# Patient Record
Sex: FEMALE | Race: OTHER | HISPANIC OR LATINO
[De-identification: names, ages, dates, MRNs, and addresses within clinical notes are randomized per-mention and may not be internally consistent; named-entity substitution may affect disease eponyms.]

---

## 2017-01-04 ENCOUNTER — OTHER (OUTPATIENT)
Age: 57
End: 2017-01-04

## 2017-01-04 DIAGNOSIS — M25.569 PAIN IN UNSPECIFIED KNEE: ICD-10-CM

## 2017-01-12 ENCOUNTER — APPOINTMENT (OUTPATIENT)
Dept: ORTHOPEDIC SURGERY | Facility: CLINIC | Age: 57
End: 2017-01-12

## 2017-01-12 VITALS
WEIGHT: 184 LBS | BODY MASS INDEX: 36.12 KG/M2 | SYSTOLIC BLOOD PRESSURE: 133 MMHG | HEART RATE: 102 BPM | DIASTOLIC BLOOD PRESSURE: 82 MMHG | HEIGHT: 60 IN

## 2017-01-12 DIAGNOSIS — Z87.09 PERSONAL HISTORY OF OTHER DISEASES OF THE RESPIRATORY SYSTEM: ICD-10-CM

## 2017-01-12 DIAGNOSIS — F17.210 NICOTINE DEPENDENCE, CIGARETTES, UNCOMPLICATED: ICD-10-CM

## 2017-01-12 DIAGNOSIS — Z80.9 FAMILY HISTORY OF MALIGNANT NEOPLASM, UNSPECIFIED: ICD-10-CM

## 2017-01-12 DIAGNOSIS — Z86.79 PERSONAL HISTORY OF OTHER DISEASES OF THE CIRCULATORY SYSTEM: ICD-10-CM

## 2017-01-12 DIAGNOSIS — Z82.61 FAMILY HISTORY OF ARTHRITIS: ICD-10-CM

## 2017-01-12 DIAGNOSIS — Z78.9 OTHER SPECIFIED HEALTH STATUS: ICD-10-CM

## 2017-01-12 RX ORDER — CLONAZEPAM 1 MG/1
1 TABLET ORAL
Refills: 0 | Status: ACTIVE | COMMUNITY

## 2017-01-12 RX ORDER — METOPROLOL SUCCINATE 100 MG/1
100 TABLET, EXTENDED RELEASE ORAL
Refills: 0 | Status: ACTIVE | COMMUNITY

## 2017-01-12 RX ORDER — ASENAPINE MALEATE 5 MG/1
5 TABLET SUBLINGUAL
Refills: 0 | Status: ACTIVE | COMMUNITY

## 2017-01-12 RX ORDER — VENLAFAXINE 37.5 MG/1
TABLET ORAL
Refills: 0 | Status: ACTIVE | COMMUNITY

## 2017-12-04 ENCOUNTER — OTHER (OUTPATIENT)
Age: 57
End: 2017-12-04

## 2019-02-15 ENCOUNTER — TRANSCRIPTION ENCOUNTER (OUTPATIENT)
Age: 59
End: 2019-02-15

## 2019-02-15 ENCOUNTER — APPOINTMENT (OUTPATIENT)
Dept: CARDIOLOGY | Facility: CLINIC | Age: 59
End: 2019-02-15

## 2019-02-15 DIAGNOSIS — E03.9 HYPOTHYROIDISM, UNSPECIFIED: ICD-10-CM

## 2019-02-15 RX ORDER — METFORMIN HYDROCHLORIDE 1000 MG/1
1000 TABLET, COATED ORAL
Refills: 0 | Status: ACTIVE | COMMUNITY

## 2019-02-15 RX ORDER — LEVOTHYROXINE SODIUM 0.1 MG/1
100 TABLET ORAL
Refills: 0 | Status: ACTIVE | COMMUNITY

## 2019-04-19 ENCOUNTER — APPOINTMENT (OUTPATIENT)
Dept: NEUROLOGY | Facility: CLINIC | Age: 59
End: 2019-04-19
Payer: MEDICARE

## 2019-04-19 VITALS
SYSTOLIC BLOOD PRESSURE: 128 MMHG | WEIGHT: 165 LBS | DIASTOLIC BLOOD PRESSURE: 80 MMHG | HEIGHT: 59 IN | BODY MASS INDEX: 33.26 KG/M2

## 2019-04-19 DIAGNOSIS — Z86.39 PERSONAL HISTORY OF OTHER ENDOCRINE, NUTRITIONAL AND METABOLIC DISEASE: ICD-10-CM

## 2019-04-19 DIAGNOSIS — Z82.49 FAMILY HISTORY OF ISCHEMIC HEART DISEASE AND OTHER DISEASES OF THE CIRCULATORY SYSTEM: ICD-10-CM

## 2019-04-19 DIAGNOSIS — I10 ESSENTIAL (PRIMARY) HYPERTENSION: ICD-10-CM

## 2019-04-19 DIAGNOSIS — I63.9 CEREBRAL INFARCTION, UNSPECIFIED: ICD-10-CM

## 2019-04-19 DIAGNOSIS — E78.5 HYPERLIPIDEMIA, UNSPECIFIED: ICD-10-CM

## 2019-04-19 DIAGNOSIS — F17.200 NICOTINE DEPENDENCE, UNSPECIFIED, UNCOMPLICATED: ICD-10-CM

## 2019-04-19 DIAGNOSIS — E11.9 TYPE 2 DIABETES MELLITUS W/OUT COMPLICATIONS: ICD-10-CM

## 2019-04-19 PROCEDURE — 99204 OFFICE O/P NEW MOD 45 MIN: CPT

## 2019-04-19 NOTE — PHYSICAL EXAM
[General Appearance - Alert] : alert [General Appearance - In No Acute Distress] : in no acute distress [General Appearance - Well Nourished] : well nourished [General Appearance - Well Developed] : well developed [Person] : oriented to person [Time] : oriented to time [Place] : oriented to place [Short Term Intact] : short term memory intact [Remote Intact] : remote memory intact [Registration Intact] : recent registration memory intact [Concentration Intact] : normal concentrating ability [Span Intact] : the attention span was normal [Visual Intact] : visual attention was ~T not ~L decreased [Naming Objects] : no difficulty naming common objects [Repeating Phrases] : no difficulty repeating a phrase [Comprehension] : comprehension intact [Fluency] : fluency intact [Current Events] : adequate knowledge of current events [Past History] : adequate knowledge of personal past history [Cranial Nerves Optic (II)] : visual acuity intact bilaterally,  visual fields full to confrontation, pupils equal round and reactive to light [Cranial Nerves Trigeminal (V)] : facial sensation intact symmetrically [Cranial Nerves Oculomotor (III)] : extraocular motion intact [Cranial Nerves Vestibulocochlear (VIII)] : hearing was intact bilaterally [Cranial Nerves Accessory (XI - Cranial And Spinal)] : head turning and shoulder shrug symmetric [Cranial Nerves Glossopharyngeal (IX)] : tongue and palate midline [Cranial Nerves Hypoglossal (XII)] : there was no tongue deviation with protrusion [Cranial Nerves Facial Central - Right Only] : central 7th nerve weakness [Cranial Nerves Right Facial: House Grade ___(1-6)] : grade III (moderate, 60%) facial nerve function [Motor Strength] : muscle strength was normal in all four extremities [No Muscle Atrophy] : normal bulk in all four extremities [Paresis Pronator Drift Right-Sided] : no pronator drift on the right [Paresis Pronator Drift Left-Sided] : no pronator drift on the left [Sensation Tactile Decrease] : light touch was intact [Sensation Pain / Temperature Decrease] : pain and temperature was intact [Sensation Vibration Decrease] : vibration was intact [Proprioception] : proprioception was intact [Past-pointing] : there was no past-pointing [Balance] : balance was intact [Tremor] : no tremor present [2+] : Patella right 2+ [FreeTextEntry8] : tardive dyskinesia in mouth [Sclera] : the sclera and conjunctiva were normal [PERRL With Normal Accommodation] : pupils were equal in size, round, reactive to light, with normal accommodation [Optic Disc Abnormality] : the optic disc were normal in size and color [Extraocular Movements] : extraocular movements were intact [No APD] : no afferent pupillary defect [No ZHENG] : no internuclear ophthalmoplegia [Normal] : normal [Full Visual Field] : full visual field [Papilledema Of Both Eyes] : no papilledema [Edema] : there was no peripheral edema [Abnormal Walk] : normal gait [Motor Tone] : muscle strength and tone were normal

## 2019-04-19 NOTE — CONSULT LETTER
[Dear  ___] : Dear  [unfilled], [Consult Letter:] : I had the pleasure of evaluating your patient, [unfilled]. [Please see my note below.] : Please see my note below. [Sincerely,] : Sincerely, [Consult Closing:] : Thank you very much for allowing me to participate in the care of this patient.  If you have any questions, please do not hesitate to contact me. [FreeTextEntry3] : Arslan Hodges M.D., Ph.D. DPN-N\par [facility]\par Neurology at [place]\par Medical Director of Stroke Services\par [facility]\par

## 2019-04-19 NOTE — REASON FOR VISIT
[Consultation] : a consultation visit [Other: _____] : [unfilled] [FreeTextEntry1] : tardive dyskinesia, dysarthria

## 2019-04-19 NOTE — REVIEW OF SYSTEMS
[As Noted in HPI] : as noted in HPI [Difficulties in Speech] : speech difficulties [Negative] : Heme/Lymph

## 2019-04-19 NOTE — HISTORY OF PRESENT ILLNESS
[FreeTextEntry1] : Initial office visit April 19, 2019:\par This is a 59-year-old woman who presents today for neurologic evaluation. She is to primary issues. The first of which is tardive dyskinesia. She takes Saphris. She has some mild mouth movements. She said the dyskinesias and she's been on this medication. She also has a dysarthria. She states about a year ago she had sudden onset of dysarthria with facial droop. She thought it was a stroke. She did not seek medical attention at the time. She has multiple stroke risk factors. She is here today for neurologic evaluation and treatment.

## 2019-04-19 NOTE — ASSESSMENT
[FreeTextEntry1] : This is a 59-year-old woman comes in today for 2 problems.\par \par Tardive dyskinesia: I would recommend to discontinue Saphris and alternate medication. This will be left up to psychiatry.\par \par Probable stroke: She has multiple stroke risk factors including hypertension diabetes high cholesterol and smoking. I suspect that she likely had a stroke a year ago and there was a lacunar infarct as blending in with her small vessel changes seen on her recent MRI. I'd recommend tight blood pressure control as well as tight glycemic control. I will send a hemoglobin A1c. I've also recommended to stop smoking. I will check a fasting lipid profile to see if she needs statin.\par \par I will call her with the results and start a statin if necessary. I will see her back in the office in 2 months, sooner should the need arise.

## 2019-04-19 NOTE — DATA REVIEWED
[de-identified] : She brought a report of an MRI of her brain that was done in January of 2019. Showed significant small vessel ischemic changes. There was no acute stroke mass or bleed.

## 2019-04-23 ENCOUNTER — APPOINTMENT (OUTPATIENT)
Dept: NEUROLOGY | Facility: CLINIC | Age: 59
End: 2019-04-23

## 2019-06-25 ENCOUNTER — APPOINTMENT (OUTPATIENT)
Dept: NEUROLOGY | Facility: CLINIC | Age: 59
End: 2019-06-25

## 2020-12-14 ENCOUNTER — EMERGENCY (EMERGENCY)
Facility: HOSPITAL | Age: 60
LOS: 1 days | Discharge: HOME | End: 2020-12-14
Attending: EMERGENCY MEDICINE
Payer: MEDICARE

## 2020-12-14 VITALS
DIASTOLIC BLOOD PRESSURE: 79 MMHG | WEIGHT: 160.06 LBS | TEMPERATURE: 98 F | HEART RATE: 76 BPM | RESPIRATION RATE: 18 BRPM | SYSTOLIC BLOOD PRESSURE: 134 MMHG | HEIGHT: 59 IN | OXYGEN SATURATION: 99 %

## 2020-12-14 LAB
FLU A RESULT: SIGNIFICANT CHANGE UP
FLU A RESULT: SIGNIFICANT CHANGE UP
FLUAV AG NPH QL: SIGNIFICANT CHANGE UP
FLUBV AG NPH QL: SIGNIFICANT CHANGE UP
RSV RESULT: SIGNIFICANT CHANGE UP
RSV RNA RESP QL NAA+PROBE: SIGNIFICANT CHANGE UP
SARS-COV-2 RNA SPEC QL NAA+PROBE: SIGNIFICANT CHANGE UP

## 2020-12-14 PROCEDURE — 73130 X-RAY EXAM OF HAND: CPT | Mod: 26,LT

## 2020-12-14 PROCEDURE — 99284 EMERGENCY DEPT VISIT MOD MDM: CPT | Mod: 25

## 2020-12-14 PROCEDURE — 73562 X-RAY EXAM OF KNEE 3: CPT

## 2020-12-14 PROCEDURE — 73130 X-RAY EXAM OF HAND: CPT

## 2020-12-14 PROCEDURE — 73110 X-RAY EXAM OF WRIST: CPT

## 2020-12-14 PROCEDURE — 29125 APPL SHORT ARM SPLINT STATIC: CPT | Mod: LT

## 2020-12-14 PROCEDURE — 73590 X-RAY EXAM OF LOWER LEG: CPT

## 2020-12-14 PROCEDURE — 73590 X-RAY EXAM OF LOWER LEG: CPT | Mod: 26,RT

## 2020-12-14 PROCEDURE — 71101 X-RAY EXAM UNILAT RIBS/CHEST: CPT | Mod: 26

## 2020-12-14 PROCEDURE — 73110 X-RAY EXAM OF WRIST: CPT | Mod: 26,LT

## 2020-12-14 PROCEDURE — 71101 X-RAY EXAM UNILAT RIBS/CHEST: CPT

## 2020-12-14 PROCEDURE — U0003: CPT

## 2020-12-14 PROCEDURE — 73562 X-RAY EXAM OF KNEE 3: CPT | Mod: 26,RT

## 2020-12-14 PROCEDURE — 87631 RESP VIRUS 3-5 TARGETS: CPT

## 2020-12-14 RX ORDER — SODIUM CHLORIDE 9 MG/ML
1000 INJECTION INTRAMUSCULAR; INTRAVENOUS; SUBCUTANEOUS ONCE
Refills: 0 | Status: DISCONTINUED | OUTPATIENT
Start: 2020-12-14 | End: 2020-12-14

## 2020-12-14 RX ORDER — IOHEXOL 300 MG/ML
30 INJECTION, SOLUTION INTRAVENOUS ONCE
Refills: 0 | Status: DISCONTINUED | OUTPATIENT
Start: 2020-12-14 | End: 2020-12-14

## 2020-12-14 RX ORDER — ONDANSETRON 8 MG/1
4 TABLET, FILM COATED ORAL ONCE
Refills: 0 | Status: DISCONTINUED | OUTPATIENT
Start: 2020-12-14 | End: 2020-12-14

## 2020-12-14 RX ORDER — MORPHINE SULFATE 50 MG/1
4 CAPSULE, EXTENDED RELEASE ORAL ONCE
Refills: 0 | Status: DISCONTINUED | OUTPATIENT
Start: 2020-12-14 | End: 2020-12-14

## 2020-12-14 NOTE — ED STATDOCS - SKIN, MLM
skin normal color for race, warm, dry and intact. ecchymosis over dorsum of left hand, no excchymosis of left lateral chest wall, abrasion to anterior surface of right knee.

## 2020-12-14 NOTE — ED STATDOCS - OBJECTIVE STATEMENT
59 y/o F with PMHx of Anxiety, Depressed, High cholesterol, Hypertension, Hypothyroid, CVA presents to the ED s/p fall c/o left     Denies 59 y/o F with PMHx of Anxiety, Depressed, High cholesterol, Hypertension, Hypothyroid, CVA presents to the ED s/p fall yesterday c/o left wrist pain. Pt also reports pain to the left knee but still can bear weight on it and pain to the left ribs.   Denies n/v 59 y/o F with PMHx of Anxiety, Depressed, High cholesterol, Hypertension, Hypothyroid, CVA presents to the ED s/p fall yesterday injuring left wrist, left side of ribs, left hand, and right knee.  Denies head trauma or LOC.  Denies back or neck pain.  H/O prior stroke:  has persistent slurred speech.  Reports that she is able to bear weight on right leg and denies sob/ pleuritic chest pain.  Denies abd pain, flank pain, hematuria.

## 2020-12-14 NOTE — ED STATDOCS - PROGRESS NOTE DETAILS
Efren MOORE: X-rays reviewed and discussed with radiologist Dr. Liu.  Sugar tong splint placed.  Pt ambulating in the ED without difficulty.  Stable for discharge with ortho f/u.

## 2020-12-14 NOTE — ED ADULT TRIAGE NOTE - CHIEF COMPLAINT QUOTE
patient fell down 1/2 a flight of stairs, son brought in grandma, c/o left wrist pain with swelling to area and left leg pain, denies LOC, blood thinners. patient has slurred speech from previous stroke.

## 2020-12-14 NOTE — ED STATDOCS - NS_ ATTENDINGSCRIBEDETAILS _ED_A_ED_FT
I, Spencer Bales, performed the initial face to face bedside interview with this patient regarding history of present illness, review of symptoms and relevant past medical, social and family history.  I completed an independent physical examination.  I was the provider who initially evaluated this patient.  The history, relevant review of systems, past medical and surgical history, medical decision making, and physical examination was documented by the scribe in my presence and I attest to the accuracy of the documentation. Follow-up on ordered tests (ie labs, radiologic studies) and re-evaluation of the patient's status has been communicated to the resident.  Disposition of the patient will be based on test outcome and response to ED interventions.

## 2020-12-14 NOTE — ED STATDOCS - MUSCULOSKELETAL, MLM
swelling and ecchymosis to the dorsum of the left hand, 3rd and 4th metacarpal tenderness, Left wrist tenderness, tenderness to the left rib #9 in the anterior axillary line, anterior aspect of right knee abrasion no effusion, tenderness to the anterior tibia. LEFT WRIST/ HAND: swelling and ecchymosis to the dorsum of the left hand, 3rd and 4th metacarpal tenderness, Left wrist tenderness, CHEST WALL/ RIBS: tenderness to the left rib #9 in the anterior axillary line without crepitus,  RIGHT KNEE: abrasion over anterior surface,  no effusion, tenderness to the anterior tibia.

## 2020-12-14 NOTE — ED STATDOCS - CLINICAL SUMMARY MEDICAL DECISION MAKING FREE TEXT BOX
Will do X-ray  to evaluate for fractures. Fall with possible hand/ wrist and rib fracture:  X-ray  to evaluate for fractures.

## 2020-12-14 NOTE — ED STATDOCS - PATIENT PORTAL LINK FT
You can access the FollowMyHealth Patient Portal offered by [facility] by registering at the following website: [email or web address]. By joining [employer]’s FollowMyHealth portal, you will also be able to view your health information using other applications (apps) compatible with our system.

## 2020-12-14 NOTE — ED STATDOCS - PHYSICAL EXAMINATION
dorsom of the left hand   swelling and ecchymosis to the dorsum of the left hand, 3rd and 4th metacarpal tenderness, Left wrist tenderness, tenderness to the left rib #9 in the anterior axillary line, anterior aspect of right knee abrasion no effusion   tend to the anterior tibia swelling and ecchymosis to the dorsum of the left hand, 3rd and 4th metacarpal tenderness, Left wrist tenderness, tenderness to the left rib #9 in the anterior axillary line, anterior aspect of right knee abrasion no effusion, tenderness to the anterior tibia.

## 2020-12-14 NOTE — ED STATDOCS - CARE PROVIDER_API CALL
Ilya Gorman)  Orthopaedic Surgery  [address] [place] [address]  Phone: [phone]  Fax: [phone]  Follow Up Time:

## 2020-12-14 NOTE — ED STATDOCS - NSFOLLOWUPINSTRUCTIONS_ED_ALL_ED_FT
ICE AND ELEVATE LEFT ARM.  TAKE TYLENOL AS NEEDED FOR PAIN.  FOLLOW UP WITH ORTHOPEDIC SURGEON.  RETURN TO THE EMERGENCY ROOM FOR ANY NEW OR WORSENING ISSUES.    ------------------------------    Wrist Fracture in Adults    WHAT YOU NEED TO KNOW:    A wrist fracture is a break in one or more of the bones in your wrist.     Adult Arm Bones         DISCHARGE INSTRUCTIONS:    Return to the emergency department if:   •Your pain gets worse or does not get better after you take pain medicine.      •Your cast or splint breaks, gets wet, or is damaged.      •Your hand or fingers feel numb or cold.      •Your hand or fingers turn white or blue.      •Your splint or cast feels too tight.      •You have more pain or swelling after the cast or splint is put on.      Call your doctor if:   •You have a fever.      •There is a foul smell or blood coming from under the cast.      •You have questions or concerns about your condition or care.      Medicines: You may need any of the following:   •Prescription pain medicine may be given. Ask your healthcare provider how to take this medicine safely. Some prescription pain medicines contain acetaminophen. Do not take other medicines that contain acetaminophen without talking to your healthcare provider. Too much acetaminophen may cause liver damage. Prescription pain medicine may cause constipation. Ask your healthcare provider how to prevent or treat constipation.       •NSAIDs, such as ibuprofen, help decrease swelling, pain, and fever. NSAIDs can cause stomach bleeding or kidney problems in certain people. If you take blood thinner medicine, always ask your healthcare provider if NSAIDs are safe for you. Always read the medicine label and follow directions.      •Acetaminophen decreases pain and fever. It is available without a doctor's order. Ask how much to take and how often to take it. Follow directions. Read the labels of all other medicines you are using to see if they also contain acetaminophen, or ask your doctor or pharmacist. Acetaminophen can cause liver damage if not taken correctly. Do not use more than 4 grams (4,000 milligrams) total of acetaminophen in one day.       •Take your medicine as directed. Contact your healthcare provider if you think your medicine is not helping or if you have side effects. Tell him or her if you are allergic to any medicine. Keep a list of the medicines, vitamins, and herbs you take. Include the amounts, and when and why you take them. Bring the list or the pill bottles to follow-up visits. Carry your medicine list with you in case of an emergency.      Self-care:   •Rest as much as possible. Do not play contact sports until the healthcare provider says it is okay.      •Apply ice on your wrist for 15 to 20 minutes every hour or as directed. Use an ice pack, or put crushed ice in a plastic bag. Cover it with a towel before you place it on your skin. Ice helps prevent tissue damage and decreases swelling and pain.      •Elevate your wrist above the level of your heart as often as possible. This will help decrease swelling and pain. Prop your wrist on pillows or blankets to keep it elevated comfortably.             Cast or splint care:   •You may take a bath or shower as directed. Do not let your cast or splint get wet. Before bathing, cover the cast or splint with 2 plastic trash bags. Tape the bags to your skin above the cast or splint to seal out the water. Keep your arm out of the water in case the bag breaks. If a plaster cast gets wet and soft, call your healthcare provider.      •Check the skin around the cast or splint every day. You may put lotion on any red or sore areas.      •Do not push down or lean on the cast or brace because it may break.      •Do not scratch the skin under the cast by putting a sharp or pointed object inside the cast.      Go to physical therapy as directed: You may need physical therapy after your wrist heals and the cast is removed. A physical therapist can teach you exercises to help improve movement and strength and to decrease pain.    Follow up with your doctor or bone specialist as directed: You may need to return to have your cast removed. You may also need an x-ray to check how well the bone has healed. Write down your questions so you remember to ask them during your visits.

## 2021-04-08 ENCOUNTER — TRANSCRIPTION ENCOUNTER (OUTPATIENT)
Age: 61
End: 2021-04-08

## 2021-10-12 ENCOUNTER — EMERGENCY (EMERGENCY)
Facility: HOSPITAL | Age: 61
LOS: 1 days | Discharge: HOME | End: 2021-10-12
Attending: EMERGENCY MEDICINE
Payer: MEDICARE

## 2021-10-12 VITALS
OXYGEN SATURATION: 98 % | SYSTOLIC BLOOD PRESSURE: 165 MMHG | TEMPERATURE: 99 F | HEIGHT: 59 IN | RESPIRATION RATE: 18 BRPM | WEIGHT: 199.96 LBS | DIASTOLIC BLOOD PRESSURE: 88 MMHG | HEART RATE: 75 BPM

## 2021-10-12 PROCEDURE — 73564 X-RAY EXAM KNEE 4 OR MORE: CPT | Mod: 26,RT

## 2021-10-12 PROCEDURE — 73564 X-RAY EXAM KNEE 4 OR MORE: CPT

## 2021-10-12 PROCEDURE — 99284 EMERGENCY DEPT VISIT MOD MDM: CPT

## 2021-10-12 PROCEDURE — 99283 EMERGENCY DEPT VISIT LOW MDM: CPT | Mod: 25

## 2021-10-12 NOTE — ED PROVIDER NOTE - NS ED ROS FT
Gen: denies fever, chills, fatigue, weight loss  Skin: denies rashes, laceration, bruising  HEENT: denies visual changes, ear pain, nasal congestion, throat pain  Respiratory: denies CACERES, SOB, cough, wheezing  Cardiovascular: denies chest pain, palpitations, diaphoresis, LE edema  GI: denies abdominal pain, n/v/d  : denies dysuria, frequency, urgency, bowel/bladder incontinence  MSK: +R knee pain/swelling. Denies back pain, neck pain  Neuro: denies headache, dizziness, weakness, numbness  Psych: denies anxiety, depression, SI/HI, visual/auditory hallucinations

## 2021-10-12 NOTE — ED PROVIDER NOTE - PATIENT PORTAL LINK FT
You can access the FollowMyHealth Patient Portal offered by [facility] by registering at the following website: [email or web address]. By joining [employer]’s FollowMyHealth portal, you will also be able to view your health information using other applications (apps) compatible with our system.

## 2021-10-12 NOTE — ED ADULT TRIAGE NOTE - BSA (M2)
Normal vision: sees adequately in most situations; can see medication labels, newsprint
1.84
negative

## 2021-10-12 NOTE — ED PROVIDER NOTE - OBJECTIVE STATEMENT
62yo F pmhx HTN, HLD, hypothyroid, DMII, CVA with residual slurred speech, anxiety, depression presents to ED c/o right knee pain, swelling s/p mechanical fall. States she was walking and accidentally stepped into hole in ground causing her to trip and fall onto right knee. No head trauma or LOC. Noting pain to anterior right knee, unable to bend at knee 2/2 pain, unable to bear weight on right. Denies numbness, tingling, weakness, hip pain, back pain, head trauma, LOC, syncope, cp, sob.

## 2021-10-12 NOTE — ED ADULT NURSE NOTE - NSIMPLEMENTINTERV_GEN_ALL_ED
Implemented All Fall with Harm Risk Interventions:  Shields to call system. Call bell, personal items and telephone within reach. Instruct patient to call for assistance. Room bathroom lighting operational. Non-slip footwear when patient is off stretcher. Physically safe environment: no spills, clutter or unnecessary equipment. Stretcher in lowest position, wheels locked, appropriate side rails in place. Provide visual cue, wrist band, yellow gown, etc. Monitor gait and stability. Monitor for mental status changes and reorient to person, place, and time. Review medications for side effects contributing to fall risk. Reinforce activity limits and safety measures with patient and family. Provide visual clues: red socks.

## 2021-10-12 NOTE — PROVIDER CONTACT NOTE (OTHER) - ASSESSMENT
Pt independent with use of RW for all mobility. Pt WBAT with knee immobilizer on. Pt declined stairs.

## 2021-10-12 NOTE — PHYSICAL THERAPY INITIAL EVALUATION ADULT - RANGE OF MOTION EXAMINATION, REHAB EVAL
Right Knee ROM not assessed due to immobilizer/bilateral upper extremity ROM was WNL (within normal limits)/Left LE ROM was WNL (within normal limits)

## 2021-10-12 NOTE — CHART NOTE - NSCHARTNOTEFT_GEN_A_CORE
RW delivered to bedside, patient aware she will be responsible for the cost if her insurance doesn't cover the cost. Patient verbalized understanding.  CCC called patients son who will be picking the patient up at the hospital.

## 2021-10-12 NOTE — ED PROVIDER NOTE - PROGRESS NOTE DETAILS
Pt ambulated with Physical therapy with walker. as per PT pt can go home with rolling walker. case management contacted, provided pt rolling walker. pt provided f/u information for ortho.

## 2021-10-12 NOTE — ED ADULT NURSE NOTE - OBJECTIVE STATEMENT
Patient has slurred speech from prior CVA, pt fell today landing on right knee having swelling and painful ambulation, pt placed in immobilizer by PA, to have PT consult

## 2021-10-12 NOTE — ED PROVIDER NOTE - CARE PROVIDER_API CALL
Ilya Gorman)  Orthopaedic Surgery  [address] [place] [address]  Phone: [phone]  Fax: [phone]  Follow Up Time:

## 2021-10-12 NOTE — ED PROVIDER NOTE - PHYSICAL EXAMINATION
Gen: No acute distress, non toxic  HEENT: Mucous membranes moist, pink conjunctivae, EOMI  CV: RRR, nl s1/s2.  Resp: CTAB, normal rate and effort  Neuro: A&O x 3, slurred speech (chronic from previous CVA), sensorimotor intact  MSK: +Effusion to right knee, pain with knee flexion. +TTP over anterior knee. compartment soft/compressible  Vasc: DP pulses 2+ b/l  Skin: No rashes. intact and perfused.

## 2021-10-12 NOTE — ED PROVIDER NOTE - ATTENDING CONTRIBUTION TO CARE
I, Charo Muhammad, performed a face to face bedside interview with this patient regarding history of present illness, review of symptoms and relevant past medical, social and family history.  I completed an independent physical examination. Medical decision making, follow-up on ordered tests (ie labs, radiologic studies) and re-evaluation of the patient's status has been communicated to the ACP.  Disposition of the patient will be based on test outcome and response to ED interventions.     patient with trip and fall onto rt knee with pain and effusion. limited ability to lift/bend rt knee due to pain. xray with vertical fracture, knee immobilizer, PT eval. outpt ortho Follow up

## 2021-10-12 NOTE — PHYSICAL THERAPY INITIAL EVALUATION ADULT - GENERAL OBSERVATIONS, REHAB EVAL
Pt received in wheelchair coming back from the bathroom, + IV Loc, + Right Knee immobilizer, breathing on RA in NAD, in 10/10 right knee pain, agreeable to PT evaluation

## 2021-10-12 NOTE — PHYSICAL THERAPY INITIAL EVALUATION ADULT - PERTINENT HX OF CURRENT PROBLEM, REHAB EVAL
presents to ED c/o right knee pain, swelling s/p mechanical fall. States she was walking and accidentally stepped into hole in ground causing her to trip and fall onto right knee

## 2021-10-12 NOTE — ED PROVIDER NOTE - NSICDXPASTMEDICALHX_GEN_ALL_CORE_FT
PAST MEDICAL HISTORY:  Anxiety     Depressed     High cholesterol     Hypertension     Hypothyroid

## 2021-10-12 NOTE — PROVIDER CONTACT NOTE (OTHER) - SITUATION
chart reviewed, contents noted, orders received. PT eval completed & documented see note for details.

## 2021-10-12 NOTE — PHYSICAL THERAPY INITIAL EVALUATION ADULT - CAPILLARY REFILL, RLE
Spoke with Judie and let her know that we do recommend that she go ahead and get her HD flu shot if she has not already. I also let her know that it looks like she is UTD on her Pneumonia vaccines for now. She verbalized understanding.    less than/equal to 3 secs

## 2021-10-12 NOTE — ED PROVIDER NOTE - NSFOLLOWUPINSTRUCTIONS_ED_ALL_ED_FT
- Follow up with your doctor within 2-3 days.   - Return to the ED for any new or worsening symptoms.   - Remain in knee immobilizer  - Elevate right leg while resting  - Ambulate with walker at all times- only put as much weight down as you can tolerate  - Follow-up with orthopedics within 1 week for further evaluation    Fracture    A fracture is a break in one of your bones. This can occur from a variety of injuries, especially traumatic ones. Symptoms include pain, bruising, or swelling. Do not use the injured limb. If a fracture is in one of the bones below your waist, do not put weight on that limb unless instructed to do so by your healthcare provider. Crutches or a cane may have been provided. A splint or cast may have been applied by your health care provider. Make sure to keep it dry and follow up with an orthopedist as instructed.    SEEK IMMEDIATE MEDICAL CARE IF YOU HAVE ANY OF THE FOLLOWING SYMPTOMS: numbness, tingling, increasing pain, or weakness in any part of the injured limb.

## 2021-10-12 NOTE — PHYSICAL THERAPY INITIAL EVALUATION ADULT - ADDITIONAL COMMENTS
Pt reports living in private home with her mom & brother who are able to assist. Pt reports there are no steps to enter, 7 stairs inside but all needs can be met on the first floor. Pt independent prior to admission. Owns no DME.

## 2021-10-29 ENCOUNTER — EMERGENCY (EMERGENCY)
Facility: HOSPITAL | Age: 61
LOS: 1 days | Discharge: HOME | End: 2021-10-29
Attending: EMERGENCY MEDICINE
Payer: MEDICARE

## 2021-10-29 VITALS
HEIGHT: 59 IN | HEART RATE: 80 BPM | OXYGEN SATURATION: 97 % | RESPIRATION RATE: 16 BRPM | TEMPERATURE: 98 F | SYSTOLIC BLOOD PRESSURE: 151 MMHG | DIASTOLIC BLOOD PRESSURE: 82 MMHG

## 2021-10-29 PROCEDURE — 99281 EMR DPT VST MAYX REQ PHY/QHP: CPT

## 2021-10-29 PROCEDURE — 99283 EMERGENCY DEPT VISIT LOW MDM: CPT

## 2021-10-29 RX ORDER — CLONAZEPAM 1 MG
1 TABLET ORAL
Qty: 15 | Refills: 0
Start: 2021-10-29 | End: 2021-11-02

## 2021-10-29 NOTE — ED PROVIDER NOTE - ATTENDING CONTRIBUTION TO CARE
Pt requiring bridging rx for clonopin.  To prevent withdrawal, rx given and pt has fu appt with clinician

## 2021-10-29 NOTE — ED PROVIDER NOTE - CLINICAL SUMMARY MEDICAL DECISION MAKING FREE TEXT BOX
62yo F with past medical history of  HTN, HLD, hypothyroid, DMII, CVA with residual slurred speech, anxiety, depression presents to ED for medication. Pt states that she ran out of her clonazepam. Pt states that she has an appointment with her Psychiatrist in 5 days. Pt not in any distress and has no SI/HI. Pt D/C in stable condition and will F/U with her psychiatrist.

## 2021-10-29 NOTE — ED PROVIDER NOTE - OBJECTIVE STATEMENT
62yo F with past medical history of  HTN, HLD, hypothyroid, DMII, CVA with residual slurred speech, anxiety, depression presents to ED for medication. Pt states that she ran out of her clonazepam. Pt states that she has an appointment with her Psychiatrist in 5 days. Pt says that she does not have any new onset of symptoms. Pt denies any SI/HI at this time.

## 2021-10-29 NOTE — ED ADULT TRIAGE NOTE - CHIEF COMPLAINT QUOTE
Pt states ran out of her Klonopin and mentions she has apt set up right now at [facility] for a refill.

## 2021-10-29 NOTE — ED PROVIDER NOTE - PATIENT PORTAL LINK FT
You can access the FollowMyHealth Patient Portal offered by [facility] by registering at the following website: [email or web address]. By joining [employer]’s FollowMyHealth portal, you will also be able to view your health information using other applications (apps) compatible with our system.

## 2023-05-26 NOTE — ED ADULT NURSE NOTE - CADM POA CENTRAL LINE
How Severe Are They?: moderate Is This A New Presentation, Or A Follow-Up?: Follow Up Actinic Keratoses No

## 2023-11-02 ENCOUNTER — APPOINTMENT (OUTPATIENT)
Dept: PULMONOLOGY | Facility: CLINIC | Age: 63
End: 2023-11-02
Payer: MEDICARE

## 2023-11-02 VITALS
WEIGHT: 150 LBS | DIASTOLIC BLOOD PRESSURE: 74 MMHG | BODY MASS INDEX: 30.24 KG/M2 | HEART RATE: 82 BPM | SYSTOLIC BLOOD PRESSURE: 126 MMHG | HEIGHT: 59 IN | RESPIRATION RATE: 16 BRPM | OXYGEN SATURATION: 97 %

## 2023-11-02 DIAGNOSIS — Z01.812 ENCOUNTER FOR PREPROCEDURAL LABORATORY EXAMINATION: ICD-10-CM

## 2023-11-02 PROCEDURE — 99204 OFFICE O/P NEW MOD 45 MIN: CPT

## 2023-11-02 RX ORDER — UMECLIDINIUM 62.5 UG/1
62.5 AEROSOL, POWDER ORAL
Qty: 1 | Refills: 5 | Status: ACTIVE | COMMUNITY
Start: 2023-11-02

## 2023-12-02 ENCOUNTER — EMERGENCY (EMERGENCY)
Facility: HOSPITAL | Age: 63
LOS: 1 days | Discharge: HOME | End: 2023-12-02
Attending: EMERGENCY MEDICINE
Payer: MEDICARE

## 2023-12-02 VITALS
HEART RATE: 75 BPM | WEIGHT: 150.8 LBS | SYSTOLIC BLOOD PRESSURE: 122 MMHG | OXYGEN SATURATION: 97 % | RESPIRATION RATE: 20 BRPM | TEMPERATURE: 98 F | HEIGHT: 64 IN | DIASTOLIC BLOOD PRESSURE: 63 MMHG

## 2023-12-02 LAB
ALBUMIN SERPL ELPH-MCNC: 3.7 G/DL — SIGNIFICANT CHANGE UP (ref 3.3–5.2)
ALBUMIN SERPL ELPH-MCNC: 3.7 G/DL — SIGNIFICANT CHANGE UP (ref 3.3–5.2)
ALP SERPL-CCNC: 70 U/L — SIGNIFICANT CHANGE UP (ref 40–120)
ALP SERPL-CCNC: 70 U/L — SIGNIFICANT CHANGE UP (ref 40–120)
ALT FLD-CCNC: 14 U/L — SIGNIFICANT CHANGE UP
ALT FLD-CCNC: 14 U/L — SIGNIFICANT CHANGE UP
ANION GAP SERPL CALC-SCNC: 13 MMOL/L — SIGNIFICANT CHANGE UP (ref 5–17)
ANION GAP SERPL CALC-SCNC: 13 MMOL/L — SIGNIFICANT CHANGE UP (ref 5–17)
AST SERPL-CCNC: 22 U/L — SIGNIFICANT CHANGE UP
AST SERPL-CCNC: 22 U/L — SIGNIFICANT CHANGE UP
BASOPHILS # BLD AUTO: 0.05 K/UL — SIGNIFICANT CHANGE UP (ref 0–0.2)
BASOPHILS # BLD AUTO: 0.05 K/UL — SIGNIFICANT CHANGE UP (ref 0–0.2)
BASOPHILS NFR BLD AUTO: 0.4 % — SIGNIFICANT CHANGE UP (ref 0–2)
BASOPHILS NFR BLD AUTO: 0.4 % — SIGNIFICANT CHANGE UP (ref 0–2)
BILIRUB SERPL-MCNC: <0.2 MG/DL — LOW (ref 0.4–2)
BILIRUB SERPL-MCNC: <0.2 MG/DL — LOW (ref 0.4–2)
BUN SERPL-MCNC: 16.8 MG/DL — SIGNIFICANT CHANGE UP (ref 8–20)
BUN SERPL-MCNC: 16.8 MG/DL — SIGNIFICANT CHANGE UP (ref 8–20)
CALCIUM SERPL-MCNC: 8.5 MG/DL — SIGNIFICANT CHANGE UP (ref 8.4–10.5)
CALCIUM SERPL-MCNC: 8.5 MG/DL — SIGNIFICANT CHANGE UP (ref 8.4–10.5)
CHLORIDE SERPL-SCNC: 101 MMOL/L — SIGNIFICANT CHANGE UP (ref 96–108)
CHLORIDE SERPL-SCNC: 101 MMOL/L — SIGNIFICANT CHANGE UP (ref 96–108)
CO2 SERPL-SCNC: 23 MMOL/L — SIGNIFICANT CHANGE UP (ref 22–29)
CO2 SERPL-SCNC: 23 MMOL/L — SIGNIFICANT CHANGE UP (ref 22–29)
CREAT SERPL-MCNC: 0.5 MG/DL — SIGNIFICANT CHANGE UP (ref 0.5–1.3)
CREAT SERPL-MCNC: 0.5 MG/DL — SIGNIFICANT CHANGE UP (ref 0.5–1.3)
EGFR: 105 ML/MIN/1.73M2 — SIGNIFICANT CHANGE UP
EGFR: 105 ML/MIN/1.73M2 — SIGNIFICANT CHANGE UP
EOSINOPHIL # BLD AUTO: 0.43 K/UL — SIGNIFICANT CHANGE UP (ref 0–0.5)
EOSINOPHIL # BLD AUTO: 0.43 K/UL — SIGNIFICANT CHANGE UP (ref 0–0.5)
EOSINOPHIL NFR BLD AUTO: 3.3 % — SIGNIFICANT CHANGE UP (ref 0–6)
EOSINOPHIL NFR BLD AUTO: 3.3 % — SIGNIFICANT CHANGE UP (ref 0–6)
FLUAV AG NPH QL: SIGNIFICANT CHANGE UP
FLUAV AG NPH QL: SIGNIFICANT CHANGE UP
FLUBV AG NPH QL: SIGNIFICANT CHANGE UP
FLUBV AG NPH QL: SIGNIFICANT CHANGE UP
GLUCOSE SERPL-MCNC: 100 MG/DL — HIGH (ref 70–99)
GLUCOSE SERPL-MCNC: 100 MG/DL — HIGH (ref 70–99)
HCT VFR BLD CALC: 40 % — SIGNIFICANT CHANGE UP (ref 34.5–45)
HCT VFR BLD CALC: 40 % — SIGNIFICANT CHANGE UP (ref 34.5–45)
HGB BLD-MCNC: 13.1 G/DL — SIGNIFICANT CHANGE UP (ref 11.5–15.5)
HGB BLD-MCNC: 13.1 G/DL — SIGNIFICANT CHANGE UP (ref 11.5–15.5)
IMM GRANULOCYTES NFR BLD AUTO: 0.2 % — SIGNIFICANT CHANGE UP (ref 0–0.9)
IMM GRANULOCYTES NFR BLD AUTO: 0.2 % — SIGNIFICANT CHANGE UP (ref 0–0.9)
LIDOCAIN IGE QN: 42 U/L — SIGNIFICANT CHANGE UP (ref 22–51)
LIDOCAIN IGE QN: 42 U/L — SIGNIFICANT CHANGE UP (ref 22–51)
LYMPHOCYTES # BLD AUTO: 23.3 % — SIGNIFICANT CHANGE UP (ref 13–44)
LYMPHOCYTES # BLD AUTO: 23.3 % — SIGNIFICANT CHANGE UP (ref 13–44)
LYMPHOCYTES # BLD AUTO: 3 K/UL — SIGNIFICANT CHANGE UP (ref 1–3.3)
LYMPHOCYTES # BLD AUTO: 3 K/UL — SIGNIFICANT CHANGE UP (ref 1–3.3)
MCHC RBC-ENTMCNC: 28.4 PG — SIGNIFICANT CHANGE UP (ref 27–34)
MCHC RBC-ENTMCNC: 28.4 PG — SIGNIFICANT CHANGE UP (ref 27–34)
MCHC RBC-ENTMCNC: 32.8 GM/DL — SIGNIFICANT CHANGE UP (ref 32–36)
MCHC RBC-ENTMCNC: 32.8 GM/DL — SIGNIFICANT CHANGE UP (ref 32–36)
MCV RBC AUTO: 86.6 FL — SIGNIFICANT CHANGE UP (ref 80–100)
MCV RBC AUTO: 86.6 FL — SIGNIFICANT CHANGE UP (ref 80–100)
MONOCYTES # BLD AUTO: 0.8 K/UL — SIGNIFICANT CHANGE UP (ref 0–0.9)
MONOCYTES # BLD AUTO: 0.8 K/UL — SIGNIFICANT CHANGE UP (ref 0–0.9)
MONOCYTES NFR BLD AUTO: 6.2 % — SIGNIFICANT CHANGE UP (ref 2–14)
MONOCYTES NFR BLD AUTO: 6.2 % — SIGNIFICANT CHANGE UP (ref 2–14)
NEUTROPHILS # BLD AUTO: 8.59 K/UL — HIGH (ref 1.8–7.4)
NEUTROPHILS # BLD AUTO: 8.59 K/UL — HIGH (ref 1.8–7.4)
NEUTROPHILS NFR BLD AUTO: 66.6 % — SIGNIFICANT CHANGE UP (ref 43–77)
NEUTROPHILS NFR BLD AUTO: 66.6 % — SIGNIFICANT CHANGE UP (ref 43–77)
PLATELET # BLD AUTO: 221 K/UL — SIGNIFICANT CHANGE UP (ref 150–400)
PLATELET # BLD AUTO: 221 K/UL — SIGNIFICANT CHANGE UP (ref 150–400)
POTASSIUM SERPL-MCNC: 4.1 MMOL/L — SIGNIFICANT CHANGE UP (ref 3.5–5.3)
POTASSIUM SERPL-MCNC: 4.1 MMOL/L — SIGNIFICANT CHANGE UP (ref 3.5–5.3)
POTASSIUM SERPL-SCNC: 4.1 MMOL/L — SIGNIFICANT CHANGE UP (ref 3.5–5.3)
POTASSIUM SERPL-SCNC: 4.1 MMOL/L — SIGNIFICANT CHANGE UP (ref 3.5–5.3)
PROT SERPL-MCNC: 6.3 G/DL — LOW (ref 6.6–8.7)
PROT SERPL-MCNC: 6.3 G/DL — LOW (ref 6.6–8.7)
RBC # BLD: 4.62 M/UL — SIGNIFICANT CHANGE UP (ref 3.8–5.2)
RBC # BLD: 4.62 M/UL — SIGNIFICANT CHANGE UP (ref 3.8–5.2)
RBC # FLD: 13.8 % — SIGNIFICANT CHANGE UP (ref 10.3–14.5)
RBC # FLD: 13.8 % — SIGNIFICANT CHANGE UP (ref 10.3–14.5)
RSV RNA NPH QL NAA+NON-PROBE: SIGNIFICANT CHANGE UP
RSV RNA NPH QL NAA+NON-PROBE: SIGNIFICANT CHANGE UP
SARS-COV-2 RNA SPEC QL NAA+PROBE: SIGNIFICANT CHANGE UP
SARS-COV-2 RNA SPEC QL NAA+PROBE: SIGNIFICANT CHANGE UP
SODIUM SERPL-SCNC: 137 MMOL/L — SIGNIFICANT CHANGE UP (ref 135–145)
SODIUM SERPL-SCNC: 137 MMOL/L — SIGNIFICANT CHANGE UP (ref 135–145)
WBC # BLD: 12.89 K/UL — HIGH (ref 3.8–10.5)
WBC # BLD: 12.89 K/UL — HIGH (ref 3.8–10.5)
WBC # FLD AUTO: 12.89 K/UL — HIGH (ref 3.8–10.5)
WBC # FLD AUTO: 12.89 K/UL — HIGH (ref 3.8–10.5)

## 2023-12-02 PROCEDURE — 93010 ELECTROCARDIOGRAM REPORT: CPT

## 2023-12-02 PROCEDURE — 80053 COMPREHEN METABOLIC PANEL: CPT

## 2023-12-02 PROCEDURE — 99284 EMERGENCY DEPT VISIT MOD MDM: CPT | Mod: 25

## 2023-12-02 PROCEDURE — 96374 THER/PROPH/DIAG INJ IV PUSH: CPT

## 2023-12-02 PROCEDURE — 36415 COLL VENOUS BLD VENIPUNCTURE: CPT

## 2023-12-02 PROCEDURE — 93005 ELECTROCARDIOGRAM TRACING: CPT

## 2023-12-02 PROCEDURE — 85025 COMPLETE CBC W/AUTO DIFF WBC: CPT

## 2023-12-02 PROCEDURE — 99284 EMERGENCY DEPT VISIT MOD MDM: CPT

## 2023-12-02 PROCEDURE — 87637 SARSCOV2&INF A&B&RSV AMP PRB: CPT

## 2023-12-02 PROCEDURE — 83690 ASSAY OF LIPASE: CPT

## 2023-12-02 RX ORDER — ONDANSETRON 8 MG/1
1 TABLET, FILM COATED ORAL
Qty: 1 | Refills: 0
Start: 2023-12-02

## 2023-12-02 RX ORDER — ONDANSETRON 8 MG/1
4 TABLET, FILM COATED ORAL ONCE
Refills: 0 | Status: COMPLETED | OUTPATIENT
Start: 2023-12-02 | End: 2023-12-02

## 2023-12-02 RX ORDER — SODIUM CHLORIDE 9 MG/ML
1000 INJECTION INTRAMUSCULAR; INTRAVENOUS; SUBCUTANEOUS ONCE
Refills: 0 | Status: COMPLETED | OUTPATIENT
Start: 2023-12-02 | End: 2023-12-02

## 2023-12-02 RX ADMIN — SODIUM CHLORIDE 1000 MILLILITER(S): 9 INJECTION INTRAMUSCULAR; INTRAVENOUS; SUBCUTANEOUS at 09:06

## 2023-12-02 RX ADMIN — ONDANSETRON 4 MILLIGRAM(S): 8 TABLET, FILM COATED ORAL at 09:06

## 2023-12-02 NOTE — ED STATDOCS - PHYSICAL EXAMINATION
Gen: NAD, AOx3  Head: NCAT  HEENT: oral mucosa moist, normal conjunctiva, oropharynx clear without exudate or erythema  Lung: CTAB, no respiratory distress, no wheezing, rales, rhonchi  CV: normal s1/s2, rrr, no murmurs, Normal perfusion, pulses 2+ throughout  Abd: soft, NTND, guarding  MSK: No edema, no visible deformities, full range of motion in all 4 extremities  Neuro: CN II-XII grossly intact, No focal neurologic deficits  Skin: No rash   Psych: normal affect

## 2023-12-02 NOTE — ED STATDOCS - WET READ LAUNCH FT
C/o sickle cell pain onset Friday.pain to bilateral arms,hips, and legs.Took pain meds 2 hours ago at home without relief.also c/o pain to back of head from fall 3 weeks ago. States he was here last week for same thing. Denies URI.thinks this pain was triggered by the weather.   There are no Wet Read(s) to document.

## 2023-12-02 NOTE — ED STATDOCS - CLINICAL SUMMARY MEDICAL DECISION MAKING FREE TEXT BOX
64 y/o female presents with nausea, vomiting, diarrhea, and malaise since yesterday. Plan to check labs, give IV fluids, and reassess. Exam nonfocal, doubt acute intraabdominal pathology. Advised follow up with pulmonologist and pcp if feeling better and results are normal 62 y/o female presents with nausea, vomiting, diarrhea, and malaise since yesterday. Plan to check labs, give IV fluids, and reassess. Exam nonfocal, doubt acute intraabdominal pathology. Advised follow up with pulmonologist and pcp if feeling better and results are normal 62 y/o female presents with nausea, vomiting, diarrhea, and malaise since yesterday. Plan to check labs, give IV fluids, and reassess. Exam nonfocal, doubt acute intraabdominal pathology/infectious/obstructive process. Likely viral gastroenteritis. Advised follow up with pulmonologist and pcp if feeling better and results are normal

## 2023-12-02 NOTE — ED STATDOCS - OBJECTIVE STATEMENT
62 y/o female with PMHx of HTN, DM, mass on right lung presents with vomiting, not tolerating PO intake, diarrhea, abdominal pain, and fatigue since yesterday. Also complains of loss of appetite and recent unintentional weight loss of 25lbs. Patient has recent seen 2 pulmonologists for the mass, and has not yet had lung biopsy. Denies fever or nausea. Reports allergy to tylenol, contrast dye    Interpretation provided by patient's son 64 y/o female with PMHx of HTN, DM, mass on right lung presents with vomiting, not tolerating PO intake, diarrhea, abdominal pain, and fatigue since yesterday. Also complains of loss of appetite and recent unintentional weight loss of 25lbs. Patient has recent seen 2 pulmonologists for the mass, and has not yet had lung biopsy. Denies fever or nausea. Reports allergy to tylenol, contrast dye    Interpretation provided by patient's son

## 2023-12-02 NOTE — ED STATDOCS - PATIENT PORTAL LINK FT
You can access the FollowMyHealth Patient Portal offered by [facility] by registering at the following website: [email or web address]. By joining [employer]’s FollowMyHealth portal, you will also be able to view your health information using other applications (apps) compatible with our system. You can access the FollowMyHealth Patient Portal offered by [facility] by registering at the following website: [email or web address]. By joining [employer]’s FollowMyHealth portal, you will also be able to view your health information using other applications (apps) compatible with our system. You can access the FollowMyHealth Patient Portal offered by [facility] by registering at the following website: [email or web address]. By joining [employer]’s FollowMyHealth portal, you will also be able to view your health information using other applications (apps) compatible with our system.

## 2023-12-02 NOTE — ED STATDOCS - PROGRESS NOTE DETAILS
Pt feels well, tolerating PO. Updated pt regarding reassuring findings. Instructed pt to follow up with her outpatient providers for further evaluation of her lung mass. Return precautions discussed. Will dc with rx for Mary Davila MD Pt feels well, tolerating PO. Updated pt regarding reassuring findings. Instructed pt to follow up with her outpatient providers for further evaluation of her lung mass. Return precautions discussed. Will dc with rx for Mary Davlia MD

## 2024-02-06 ENCOUNTER — APPOINTMENT (OUTPATIENT)
Dept: PULMONOLOGY | Facility: CLINIC | Age: 64
End: 2024-02-06
Payer: MEDICARE

## 2024-02-06 VITALS
SYSTOLIC BLOOD PRESSURE: 124 MMHG | RESPIRATION RATE: 16 BRPM | HEART RATE: 70 BPM | DIASTOLIC BLOOD PRESSURE: 72 MMHG | OXYGEN SATURATION: 96 %

## 2024-02-06 VITALS — BODY MASS INDEX: 23.1 KG/M2 | WEIGHT: 132 LBS | HEIGHT: 63.5 IN

## 2024-02-06 DIAGNOSIS — R91.8 OTHER NONSPECIFIC ABNORMAL FINDING OF LUNG FIELD: ICD-10-CM

## 2024-02-06 PROCEDURE — 85018 HEMOGLOBIN: CPT | Mod: QW

## 2024-02-06 PROCEDURE — 99214 OFFICE O/P EST MOD 30 MIN: CPT | Mod: 25

## 2024-02-06 PROCEDURE — 94727 GAS DIL/WSHOT DETER LNG VOL: CPT

## 2024-02-06 PROCEDURE — 94010 BREATHING CAPACITY TEST: CPT

## 2024-02-06 PROCEDURE — 94729 DIFFUSING CAPACITY: CPT

## 2024-02-06 RX ORDER — IRON/IRON ASP GLY/FA/MV-MIN 38 125-25-1MG
TABLET ORAL
Refills: 0 | Status: ACTIVE | COMMUNITY

## 2024-02-06 NOTE — ASSESSMENT
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, former smoker (30 pack years, D/C 2021), HTN, HLD, DM, hypothyroidism, anxiety/depression who presents for f/u visit  - PFT today showed FEV1/FVC 75%, FEV1 82%, FVC 83%, MMEF 75/25 57%, DLCOc 79% - This shows no significant obstruction or restriction with normal DLCO - She needs repeat CT chest ASAP - She needs IR biopsy of R lung pleural-based mass - Referral was ordered for IR biopsy - Phone number given to son - Will need PST - At this time she has no significant pulmonary contraindications to proceed with IR biopsy; she is a mild pulmonary risk - F/u in approx 3 mo time  The patient expressed understanding and agreement with the plan as outlined above, and accepts that it is their responsibility to be compliant with any recommended testing, treatment, and follow-up visits. All relevant questions and concerns were addressed.  30 minutes of time were spent on the encounter. Medical records were reviewed, including but not limited to hospital records, outpatient records, laboratory data, and diagnostic imaging studies. Greater than 50% of the face-to-face encounter time was spent on counseling and/or coordination of care.  Ilya Snow M.D. Pulmonary & Critical Care Medicine [facility] [place] [address] [place] [address] T: [phone] F: [phone]

## 2024-02-06 NOTE — RESULTS/DATA
[TextEntry] : CHANELLE PATIENT NAME: Kam Clark Zoraya PATIENT PHONE NUMBER: [phone] PATIENT ID: [id number] : 1960 DATE OF EXAM: 10/13/2023 R. Phys. Name: Jael Joe R. Phys. Address: [address] [place] [address] R. Phys. Phone: [phone] CT-CHEST NON CONTRAST  History: Abnormal lung findings.  R91.1 Pulmonary Nodule   CT scan of the chest was performed with multislice axial images with reconstructions performed in axial, sagittal and coronal planes. This study was performed using automatic exposure control and an iterative reconstruction technique (radiation dose reduction software) to obtain a diagnostic image quality scan with patient dose as low as reasonably achievable. mA and kV were adjusted according to patient's size. The administered radiation dose was 3.34 mSv.  Comparison is made with recent PET/CT from 2023.  THORACIC INLET: No suspicious mass.  LUNGS AND PLEURA: There is persistent subpleural lobular density in posterior right lower lobe without significant change or improvement. This spans about 4 x 2.5 x 5 cm.  There are multiple new nodular densities in posterior right upper lobe, in cluster or tree and bud pattern. Individual nodular density can measure up to 13 mm.  There are subpleural curvilinear scarlike densities in anterior right upper lobe and along the minor fissure. Correlate with prior pulmonary history.  There is pleural thickening along right lung base posteriorly. There is bronchial wall thickening reflecting bronchial inflammatory changes. There is no suspicious lung infiltrate in left lung.  MEDIASTINUM, YADY AND AXILLAE: No suspicious mass or adenopathy.  HEART AND VESSELS: Heart size is within normal limits. Coronary calcifications are noted. There is no aneurysm of thoracic aorta.  CHEST WALL/SOFT TISSUES: No suspicious mass.  LIMITED UPPER ABDOMEN: No suspicious new finding.  BONES: No suspicious new finding.  IMPRESSION:  New cluster of nodular densities in posterior right upper lobe, perhaps inflammatory process. Correlate clinically to rule out lingering symptom of pneumonia.  No significant change or improvement of subpleural lobular masslike consolidation in posterior right lower lobe, nature unclear.  Continue close CT monitoring (in 1-3 months) or tissue sampling to rule out neoplasm as clinically indicated.  Signed by: Anay Mcadams MD Signed Date: 10/13/2023 7:42 PM EDT    SIGNED BY: Anay Mcadams M.D., Ext. [phone] 10/13/2023 07:42 PM  ~~~~~~~~~~~~~~~~~~~~~~~~~~~~~~~~~~~~~~~~~~~~~~~~~~~~~~~~~~~~~~~~~~~~~~~~  [facility]ROTONLENORA PATIENT NAME: Kam Clark Zoraya PATIENT PHONE NUMBER: [phone] PATIENT ID: [id number] : 1960 DATE OF EXAM: 2023 R. Phys. Name: Jael Joe R. Phys. Address: [address] [place] [address] R. Phys. Phone: [phone] NM PET/CT SKULL BASE TO MID THIGH  HISTORY: Evaluate pulmonary nodule INDICATION: The patient presents for initial treatment strategy.  TECHNIQUE: 58 minutes following the following intravenous administration of 12.1 mCi of F-18 labeled Fluorodeoxyglucose (FDG) in a Right Antecubital vein, whole body scan was performed with acquisition from the base of skull to mid femurs. Prior to intravenous FDG administration, fingerstick glucose level was measured, with a value of 130 mg/dl. The SUV is normalized to body weight and is reported as SUV maximum.  CONTRAST: Oral contrast was administered as per protocol. IV contrast was not administered.  COMPARISON: No prior PET/CT is available for comparison  SCINTIGRAPHIC FINDINGS: HEAD AND NECK: Normal FDG uptake in the visualized portions of brain as well as the soft tissues of the head and neck. CHEST: There is mild FDG activity corresponding with a consolidative opacity in the posterior right lung base (SUV 2.5, 5.4 x 2.5 cm, series 3 image 103). Findings are likely infectious/inflammatory in nature. Low grade malignancy cannot be excluded. CT follow-up versus biopsy is recommended as clinically indicated. There is mild FDG activity corresponding with linear atelectatic changes in the lateral right upper lobe (SUV 1.7), and anteriorly (SUV 1.2). Findings are likely infectious/inflammatory in nature. ABDOMEN AND PELVIS: Normal FDG uptake in the liver, spleen, renal cortices, renal collecting systems, ureters and urinary bladder. MUSCULOSKELETAL: Normal distribution of FDG uptake throughout the bone marrow.  CT FINDINGS: SOFT TISSUES NECK: There are no enlarged lymph nodes. The thyroid gland is unremarkable. AXILLAE AND CHEST WALL: There is no adenopathy or chest wall mass. MEDIASTINUM AND YADY: There is no mass or adenopathy. HEART AND GREAT VESSELS: The cardiac size is normal. The thoracic aorta is unremarkable. LUNGS AND AIRWAYS: There are FDG avid right pulmonary opacities as described above. There is a 0.6 cm posterior left upper lobe nodule (series 3 image 67) without FDG activity. The major central bronchi are patent. PLEURA: There is no pleural effusion. LIVER: Normal size. No focal lesions are identified by CT. GALLBLADDER/BILIARY TREE: The gallbladder is unremarkable by CT. There is no biliary ductal dilatation. SPLEEN: Normal. PANCREAS: Unremarkable unenhanced pancreas by CT. ADRENAL GLANDS: Normal. KIDNEYS/URETERS/BLADDER: Renal size is normal. There are no contour deforming renal masses. There are no renal, ureteral or bladder calculi. There is no hydronephrosis nor hydroureter. BOWEL/MESENTERY/PERITONEUM: No bowel obstruction. No ascites. No obvious abnormality of the stomach or large or small bowel identified. ABDOMINAL WALL: There is no hernia or mass. RETROPERITONEUM: There is no retroperitoneal or pelvic lymphadenopathy. VESSELS: The abdominal aorta is normal in caliber. PELVIC VISCERA: No masses are present. OSSEOUS STRUCTURES: There are no lytic or blastic lesions.  IMPRESSION:   There is an FDG avid consolidative right lower lobe masslike opacity as described above. Findings may be infectious/inflammatory in nature. Malignancy is less likely although cannot be excluded. Continued close interval CT follow-up is recommended if biopsy is deferred at this time.  There are FDG avid linearly oriented atelectatic changes in the right upper lung field, as described above. Attention to these findings on follow-up examination is recommended.  Signed by: Rachel Beach M.D. Signed Date: 2023 10:24 AM EDT    SIGNED BY: Rachel Beach M.D., Ext. [phone] 10:24 AM   ~~~~~~~~~~~~~~~~~~~~~~~~~~~~~~~~~~~~~~~~~~~~~~~~~~~~~~~~~~~~~~~~~~~~~~~~

## 2024-02-06 NOTE — PHYSICAL EXAM
[TextEntry] : Gen - In NAD, communicating easily HEENT - NC/AT CV - +S1, S2 Resp - CTA B/L, good inspiratory effort, non-labored breathing Ext - No pedal edema Skin - No exposed rashes or lesions Psych - Normal affect

## 2024-05-03 ENCOUNTER — APPOINTMENT (OUTPATIENT)
Dept: PULMONOLOGY | Facility: CLINIC | Age: 64
End: 2024-05-03

## 2024-07-24 ENCOUNTER — APPOINTMENT (OUTPATIENT)
Dept: NEUROLOGY | Facility: CLINIC | Age: 64
End: 2024-07-24

## 2024-12-03 ENCOUNTER — EMERGENCY (EMERGENCY)
Facility: HOSPITAL | Age: 64
LOS: 1 days | Discharge: HOME | End: 2024-12-03
Attending: EMERGENCY MEDICINE
Payer: MEDICARE

## 2024-12-03 VITALS
OXYGEN SATURATION: 98 % | DIASTOLIC BLOOD PRESSURE: 77 MMHG | RESPIRATION RATE: 18 BRPM | TEMPERATURE: 98 F | SYSTOLIC BLOOD PRESSURE: 124 MMHG | HEART RATE: 90 BPM

## 2024-12-03 VITALS
WEIGHT: 159.17 LBS | HEIGHT: 59 IN | HEART RATE: 100 BPM | RESPIRATION RATE: 16 BRPM | DIASTOLIC BLOOD PRESSURE: 71 MMHG | SYSTOLIC BLOOD PRESSURE: 129 MMHG

## 2024-12-03 LAB
ALBUMIN SERPL ELPH-MCNC: 3.9 G/DL — SIGNIFICANT CHANGE UP (ref 3.3–5.2)
ALP SERPL-CCNC: 81 U/L — SIGNIFICANT CHANGE UP (ref 40–120)
ALT FLD-CCNC: 10 U/L — SIGNIFICANT CHANGE UP
ANION GAP SERPL CALC-SCNC: 8 MMOL/L — SIGNIFICANT CHANGE UP (ref 5–17)
AST SERPL-CCNC: 14 U/L — SIGNIFICANT CHANGE UP
BASOPHILS # BLD AUTO: 0.03 K/UL — SIGNIFICANT CHANGE UP (ref 0–0.2)
BASOPHILS NFR BLD AUTO: 0.3 % — SIGNIFICANT CHANGE UP (ref 0–2)
BILIRUB SERPL-MCNC: <0.2 MG/DL — LOW (ref 0.4–2)
BUN SERPL-MCNC: 16.6 MG/DL — SIGNIFICANT CHANGE UP (ref 8–20)
CALCIUM SERPL-MCNC: 9.7 MG/DL — SIGNIFICANT CHANGE UP (ref 8.4–10.5)
CHLORIDE SERPL-SCNC: 104 MMOL/L — SIGNIFICANT CHANGE UP (ref 96–108)
CO2 SERPL-SCNC: 25 MMOL/L — SIGNIFICANT CHANGE UP (ref 22–29)
CREAT SERPL-MCNC: 0.49 MG/DL — LOW (ref 0.5–1.3)
D DIMER BLD IA.RAPID-MCNC: 173 NG/ML DDU — SIGNIFICANT CHANGE UP
EGFR: 105 ML/MIN/1.73M2 — SIGNIFICANT CHANGE UP
EOSINOPHIL # BLD AUTO: 0.14 K/UL — SIGNIFICANT CHANGE UP (ref 0–0.5)
EOSINOPHIL NFR BLD AUTO: 1.3 % — SIGNIFICANT CHANGE UP (ref 0–6)
FLUAV AG NPH QL: SIGNIFICANT CHANGE UP
FLUBV AG NPH QL: SIGNIFICANT CHANGE UP
GLUCOSE SERPL-MCNC: 58 MG/DL — LOW (ref 70–99)
HCT VFR BLD CALC: 37.4 % — SIGNIFICANT CHANGE UP (ref 34.5–45)
HGB BLD-MCNC: 12.3 G/DL — SIGNIFICANT CHANGE UP (ref 11.5–15.5)
IMM GRANULOCYTES NFR BLD AUTO: 0.2 % — SIGNIFICANT CHANGE UP (ref 0–0.9)
LYMPHOCYTES # BLD AUTO: 2.48 K/UL — SIGNIFICANT CHANGE UP (ref 1–3.3)
LYMPHOCYTES # BLD AUTO: 23.6 % — SIGNIFICANT CHANGE UP (ref 13–44)
MCHC RBC-ENTMCNC: 28.1 PG — SIGNIFICANT CHANGE UP (ref 27–34)
MCHC RBC-ENTMCNC: 32.9 G/DL — SIGNIFICANT CHANGE UP (ref 32–36)
MCV RBC AUTO: 85.6 FL — SIGNIFICANT CHANGE UP (ref 80–100)
MONOCYTES # BLD AUTO: 0.67 K/UL — SIGNIFICANT CHANGE UP (ref 0–0.9)
MONOCYTES NFR BLD AUTO: 6.4 % — SIGNIFICANT CHANGE UP (ref 2–14)
NEUTROPHILS # BLD AUTO: 7.18 K/UL — SIGNIFICANT CHANGE UP (ref 1.8–7.4)
NEUTROPHILS NFR BLD AUTO: 68.2 % — SIGNIFICANT CHANGE UP (ref 43–77)
NT-PROBNP SERPL-SCNC: 138 PG/ML — SIGNIFICANT CHANGE UP (ref 0–300)
PLATELET # BLD AUTO: 242 K/UL — SIGNIFICANT CHANGE UP (ref 150–400)
POTASSIUM SERPL-MCNC: 3.4 MMOL/L — LOW (ref 3.5–5.3)
POTASSIUM SERPL-SCNC: 3.4 MMOL/L — LOW (ref 3.5–5.3)
PROT SERPL-MCNC: 6.7 G/DL — SIGNIFICANT CHANGE UP (ref 6.6–8.7)
RBC # BLD: 4.37 M/UL — SIGNIFICANT CHANGE UP (ref 3.8–5.2)
RBC # FLD: 13.4 % — SIGNIFICANT CHANGE UP (ref 10.3–14.5)
RSV RNA NPH QL NAA+NON-PROBE: SIGNIFICANT CHANGE UP
SARS-COV-2 RNA SPEC QL NAA+PROBE: SIGNIFICANT CHANGE UP
SODIUM SERPL-SCNC: 137 MMOL/L — SIGNIFICANT CHANGE UP (ref 135–145)
WBC # BLD: 10.52 K/UL — HIGH (ref 3.8–10.5)
WBC # FLD AUTO: 10.52 K/UL — HIGH (ref 3.8–10.5)

## 2024-12-03 PROCEDURE — 71045 X-RAY EXAM CHEST 1 VIEW: CPT | Mod: 26

## 2024-12-03 PROCEDURE — 36415 COLL VENOUS BLD VENIPUNCTURE: CPT

## 2024-12-03 PROCEDURE — 85379 FIBRIN DEGRADATION QUANT: CPT

## 2024-12-03 PROCEDURE — 71045 X-RAY EXAM CHEST 1 VIEW: CPT

## 2024-12-03 PROCEDURE — 71250 CT THORAX DX C-: CPT | Mod: MC

## 2024-12-03 PROCEDURE — 85025 COMPLETE CBC W/AUTO DIFF WBC: CPT

## 2024-12-03 PROCEDURE — 80053 COMPREHEN METABOLIC PANEL: CPT

## 2024-12-03 PROCEDURE — 93005 ELECTROCARDIOGRAM TRACING: CPT

## 2024-12-03 PROCEDURE — 83880 ASSAY OF NATRIURETIC PEPTIDE: CPT

## 2024-12-03 PROCEDURE — 87637 SARSCOV2&INF A&B&RSV AMP PRB: CPT

## 2024-12-03 PROCEDURE — 99285 EMERGENCY DEPT VISIT HI MDM: CPT | Mod: 25

## 2024-12-03 PROCEDURE — 99285 EMERGENCY DEPT VISIT HI MDM: CPT

## 2024-12-03 PROCEDURE — 71250 CT THORAX DX C-: CPT | Mod: 26,MC

## 2024-12-03 PROCEDURE — 93010 ELECTROCARDIOGRAM REPORT: CPT

## 2024-12-03 NOTE — ED PROVIDER NOTE - NSFOLLOWUPINSTRUCTIONS_ED_ALL_ED_FT
You were evaluated in the emergency room for shortness of breath. Your imaging and blood work was normal. Please follow-up with the pulmonologist in this packet. Please call to make an appointment.     Shortness of breath (dyspnea) means you have trouble breathing and could indicate a medical problem. Causes include lung disease, heart disease, low amount of red blood cells (anemia), poor physical fitness, being overweight, smoking, etc. Your health care provider today may not be able to find a cause for your shortness of breath after your exam. In this case, it is important to have a follow-up exam with your primary care physician as instructed. If medicines were prescribed, take them as directed for the full length of time directed. Refrain from tobacco products.    SEEK IMMEDIATE MEDICAL CARE IF YOU HAVE ANY OF THE FOLLOWING SYMPTOMS: worsening shortness of breath, chest pain, back pain, abdominal pain, fever, coughing up blood, lightheadedness/dizziness.

## 2024-12-03 NOTE — ED PROVIDER NOTE - CLINICAL SUMMARY MEDICAL DECISION MAKING FREE TEXT BOX
64y Female hx HTN, HLD, CVA, asthma, lung mass s/p biopsy complaining of shortness of breath. Plan for imaging, labs, ekg. 64y Female hx HTN, HLD, CVA, asthma, lung mass s/p biopsy complaining of shortness of breath. Plan for imaging, labs, ekg. D-dimer 173, CT shows right lower lobe atelectasis with right pleural thickening and scarring in right upper and middle lobes. Will dc with pulmonology follow up. EKG shows nsr, no signs of ischemia. Swab negative.

## 2024-12-03 NOTE — ED ADULT NURSE NOTE - OBJECTIVE STATEMENT
Pt is A&OX4 c/o SOB and cough X few days. Son at bedside states his mom has a noncancerous mass on her lungs that she gets check ups for every 6 months. Per son he called pulmonologist dt his mom c/o SOB, intermittent midsternal CP and dizziness and was referred to ED. Pt states she threw up this morning once but denies any nausea currently. Pt denies any fever, chills. Denies any at home oxygen use, NSR on tele, respirations even and unlabored. IV established and blood sent to lab. Bed in lowest position with wheels locked. Pt able to make needs known.

## 2024-12-03 NOTE — ED ADULT TRIAGE NOTE - CHIEF COMPLAINT QUOTE
"she has a mass in her lungs. the symptoms shes having might be a clot. upper back pain and out of breath."

## 2024-12-03 NOTE — ED PROVIDER NOTE - IV ALTEPLASE INCLUSION HIDDEN
show Infliximab Counseling:  I discussed with the patient the risks of infliximab including but not limited to myelosuppression, immunosuppression, autoimmune hepatitis, demyelinating diseases, lymphoma, and serious infections.  The patient understands that monitoring is required including a PPD at baseline and must alert us or the primary physician if symptoms of infection or other concerning signs are noted.

## 2024-12-03 NOTE — ED PROVIDER NOTE - PATIENT PORTAL LINK FT
You can access the FollowMyHealth Patient Portal offered by [facility] by registering at the following website: [email or web address]. By joining [email or web address]’s FollowMyHealth portal, you will also be able to view your health information using other applications (apps) compatible with our system.

## 2024-12-03 NOTE — ED PROVIDER NOTE - OBJECTIVE STATEMENT
64y Female hx HTN, HLD, CVA, asthma, lung mass s/p biopsy complaining of shortness of breath. Patient states she has had shortness of breath for the past week, has been using her nebulizer more frequently. Patient c/o upper chest pain, non productive cough, 1 episode of NBNB vomiting this morning. Patient spoke to PCP today who sent them in to r/o PE. Has hx lung mass, biopsied and benign. Follows with pulm Dr. Carolina at [facility]. Denies any other cancer hx, no recent long travel, not sedentary, no recent fx. Denies fevers, abdominal pain, numbness or tingling, lightheadedness. Hx CVA, no hx MI. Patient was on AC in the past, stopped 1 year ago.

## 2024-12-03 NOTE — ED PROVIDER NOTE - ATTENDING CONTRIBUTION TO CARE
64-year-old female with history of hypertension, hyperlipidemia status post CVA, asthma history of benign lung mass presents to ED complaining of worsening shortness of breath x 1 week with associated nonproductive cough and upper chest pain.  Patient mitts to episode of 1 episode of vomiting this morning.  Patient spoke with her family practitioner and was advised to come to the ED for further evaluation to rule out possible PE.  Patient with no prior history of PE in the past.   patient with hx of severe contrast allergy on exam patient awake and alert nontoxic-appearing in no acute distress. ) moist, neck supple, heart regular, lungs clear bilaterally, abdomen soft no localized tenderness, extremities full range of motion no cyanosis or edema, neuro no focal deficits.  Based on history of severe contrast allergy we will check D-dimer and if elevated we will have to premedicate for CTA.

## 2024-12-03 NOTE — ED PROVIDER NOTE - PHYSICAL EXAMINATION
VITAL SIGNS: I have reviewed nursing notes and confirm.  CONSTITUTIONAL:  in no acute distress.  SKIN: Skin exam is warm and dry, no acute rash.  HEAD: Normocephalic; atraumatic.  EYES: PERRL, EOM intact; conjunctiva and sclera clear.  ENT: No nasal discharge; airway clear. Throat clear.  NECK: Supple; non tender.    CARD: Regular rate and rhythm.  RESP: No wheezes,  no rales or rhonchi. (+) Dec breath sounds on Lt.   ABD:  soft; non-distended; non-tender;   EXT: Normal ROM. No clubbing, cyanosis or edema.  NEURO: Alert, oriented. Grossly unremarkable. No focal deficits.  moves all extremities,  normal gait   PSYCH: Cooperative, appropriate.

## 2024-12-03 NOTE — ED PROVIDER NOTE - PROGRESS NOTE DETAILS
Constantino: D-dimer 173, CT shows right lower lobe atelectasis with right pleural thickening and scarring in right upper and middle lobes. Will dc with pulmonology follow up.

## 2024-12-03 NOTE — ED PROVIDER NOTE - CARE PROVIDER_API CALL
Fuentes Rodriguez  Critical Care Medicine  [address] [place] [id number]  Phone: [phone]  Fax: [phone]  Follow Up Time: